# Patient Record
Sex: MALE | Race: WHITE | NOT HISPANIC OR LATINO | Employment: FULL TIME | URBAN - METROPOLITAN AREA
[De-identification: names, ages, dates, MRNs, and addresses within clinical notes are randomized per-mention and may not be internally consistent; named-entity substitution may affect disease eponyms.]

---

## 2018-10-17 ENCOUNTER — OFFICE VISIT (OUTPATIENT)
Dept: URGENT CARE | Facility: CLINIC | Age: 58
End: 2018-10-17
Payer: COMMERCIAL

## 2018-10-17 VITALS
TEMPERATURE: 97.3 F | WEIGHT: 315 LBS | RESPIRATION RATE: 12 BRPM | BODY MASS INDEX: 42.66 KG/M2 | OXYGEN SATURATION: 94 % | SYSTOLIC BLOOD PRESSURE: 130 MMHG | HEART RATE: 93 BPM | HEIGHT: 72 IN | DIASTOLIC BLOOD PRESSURE: 90 MMHG

## 2018-10-17 DIAGNOSIS — L23.9 ALLERGIC DERMATITIS: Primary | ICD-10-CM

## 2018-10-17 DIAGNOSIS — I50.22 CHRONIC SYSTOLIC CONGESTIVE HEART FAILURE (HCC): ICD-10-CM

## 2018-10-17 DIAGNOSIS — I87.2 VENOUS STASIS DERMATITIS OF BOTH LOWER EXTREMITIES: ICD-10-CM

## 2018-10-17 PROBLEM — N18.2 STAGE 2 CHRONIC KIDNEY DISEASE DUE TO TYPE 2 DIABETES MELLITUS (HCC): Status: ACTIVE | Noted: 2017-12-04

## 2018-10-17 PROBLEM — Z79.01 LONG TERM CURRENT USE OF ANTICOAGULANT: Status: ACTIVE | Noted: 2018-09-21

## 2018-10-17 PROBLEM — E11.22 STAGE 2 CHRONIC KIDNEY DISEASE DUE TO TYPE 2 DIABETES MELLITUS (HCC): Status: ACTIVE | Noted: 2017-12-04

## 2018-10-17 PROCEDURE — 99204 OFFICE O/P NEW MOD 45 MIN: CPT | Performed by: INTERNAL MEDICINE

## 2018-10-17 RX ORDER — TRIAMCINOLONE ACETONIDE 1 MG/ML
LOTION TOPICAL
Qty: 1 BOTTLE | Refills: 0 | Status: SHIPPED | OUTPATIENT
Start: 2018-10-17

## 2018-10-17 ASSESSMENT — ENCOUNTER SYMPTOMS
SHORTNESS OF BREATH: 0
VOMITING: 0
COUGH: 0
CHILLS: 0
DIARRHEA: 0
NAUSEA: 0
SORE THROAT: 0
FATIGUE: 0
ABDOMINAL PAIN: 0
CONSTIPATION: 0
FEVER: 0

## 2018-10-17 NOTE — PROGRESS NOTES
Subjective:   Spenser Walton is a 58 y.o. male who presents for Rash (rash on both legs on top of left foot, also mild ithcing on arms x5 days )        Rash   This is a new problem. Episode onset: 5 days. The affected locations include the right foot, left foot, right arm and left arm. The rash is characterized by itchiness. It is unknown (Patient recently traveling from [place] to [facility] ) if there was an exposure to a precipitant. Pertinent negatives include no congestion, cough, diarrhea, facial edema, fatigue, fever, shortness of breath, sore throat or vomiting. Treatments tried: OTC hydrocortisone. The treatment provided no relief. There is no history of allergies, asthma or eczema.     Patient noticed rash 5 days ago.  It has now spread to lower arms.  Rash is itchy.  He has tried OTC hydrocortisone without improvement.  He does have a history of CHF, most recent exacerbation occurred 2 weeks ago required change in carvedilol.  He is unsure of new medication but states he has been on it for 2 weeks without complication.  He denies shortness of breath, difficulty swallowing or breathing.  No known allergies.    He does have a follow-up scheduled with primary care in [place] on Friday.    Review of Systems   Constitutional: Negative for chills, fatigue, fever and malaise/fatigue.   HENT: Negative for congestion and sore throat.    Respiratory: Negative for cough and shortness of breath.    Gastrointestinal: Negative for abdominal pain, constipation, diarrhea, nausea and vomiting.   Skin: Positive for itching and rash.   All other systems reviewed and are negative.      PMH:  has a past medical history of CHF (congestive heart failure) ([facility]).  MEDS:   Current Outpatient Prescriptions:   •  triamcinolone (KENALOG) 0.1 % lotion, Apply to affected area BID, Disp: 1 Bottle, Rfl: 0  ALLERGIES:   Allergies   Allergen Reactions   • Nsaids Unspecified     Avoid     SURGHX: History reviewed. No pertinent surgical  history.  SOCHX:  reports that he has never smoked. He has never used smokeless tobacco.  History reviewed. No pertinent family history.     Objective:   /90 (BP Location: Right arm, Patient Position: Sitting, BP Cuff Size: Large adult)   Pulse 93   Temp 36.3 °C (97.3 °F) (Temporal)   Resp 12   Ht 1.829 m (6')   Wt (!) 179 kg (394 lb 9.6 oz)   SpO2 94%   BMI 53.52 kg/m²     Physical Exam   Constitutional: He is oriented to person, place, and time. He appears well-developed and well-nourished. No distress.   HENT:   Head: Normocephalic and atraumatic.   Eyes: Pupils are equal, round, and reactive to light. Conjunctivae are normal.   Neck: Normal range of motion. Neck supple.   Cardiovascular: Normal rate and regular rhythm.    Pulmonary/Chest: Effort normal and breath sounds normal. No respiratory distress. He has no wheezes. He has no rales.   Lymphadenopathy:     He has no cervical adenopathy.   Neurological: He is alert and oriented to person, place, and time.   Skin: Skin is warm and dry.        Psychiatric: He has a normal mood and affect. His behavior is normal.   Vitals reviewed.        Assessment/Plan:     1. Allergic dermatitis  triamcinolone (KENALOG) 0.1 % lotion   2. Venous stasis dermatitis of both lower extremities     3. Chronic systolic congestive heart failure (HCC)       Advised patient to start a trial of Kenalog lotion.  Patient directed to D/C all suspicious products, frequent-containing products.  He can reintroduce products one by one monitoring for recurrence.  Switch to gentle cleanser CeraVe A/Cetaphil.  Apply sick hydrating cream after application of Kenalog.  Allergic dermatitis and stasis dermatitis educational handout provided.      We discussed the importance of follow-up with primary care provider on Friday as scheduled. Red flags and strict emergency room precautions discussed.  Patient appears understanding of information.       Case and results reviewed and agree with  treatment plan as outlined.  Dr. Zuelta

## 2018-10-17 NOTE — PATIENT INSTRUCTIONS
Switch to:     CeraVe or Cetaphil plain body wash   Apply topical triamcinolone twice day   Apply Cerave or Cetaphil hydrating cream over   Consider adding Claritin/Zyrtec every day for itching   Consider switching to All Free and Clear detergent     Follow up as previous scheduled on Friday with pcp      Stasis Dermatitis  Stasis dermatitis is a long-term (chronic) skin condition that happens when veins can no longer pump blood back to the heart (poor circulation). This condition causes a red or brown scaly rash or sores (ulcers) from the pooling of blood (stasis). This condition usually affects the lower legs. It may affect one leg or both legs.  Without treatment, severe stasis dermatitis can lead to other skin conditions and infections.  What are the causes?  This condition is caused by poor circulation.  What increases the risk?  This condition is more likely to develop in people who:  · Are not very active.  · Stand for long periods of time.  · Have veins that have become enlarged and twisted (varicose veins).  · Have leg veins that are not strong enough to send blood back to the heart (venous insufficiency).  · Have had a blood clot.  · Have been pregnant many times.  · Have had vein surgery.  · Are obese.  · Have heart or kidney failure.  · Are 50 years of age or older.  What are the signs or symptoms?  Common early symptoms of this condition include:  · Swelling in your ankle or leg. This might get better overnight but be worse again in the day.  · Skin that looks thin on your ankle and leg.  · Brown marks that develop slowly.  · Skin that is easily irritated or cracked.  · Red, swollen skin.  · An achy or heavy feeling after you walk or stand for long periods of time.  · Pain.  Later and more severe symptoms of this condition include:  · Skin that looks shiny.  · Small, open sores (ulcers). These are often red or purple.  · Dry, cracking skin.  · Skin that feels hard.  · Severe itching.  · A change in the  shape or color of your lower legs.  · Severe pain.  · Difficulty walking.  How is this diagnosed?  Your health care provider may suspect this condition from your symptoms and medical history. Your health care provider will also do a physical exam. You may need to see a health care provider who specializes in skin diseases (dermatologist). You may also have tests to confirm the diagnosis, including:  · Blood tests.  · Imaging studies to check blood flow (Doppler ultrasound).  · Allergy tests.  How is this treated?  Treatment for this condition may include medicine, such as:  · Corticosteroid creams and ointments.  · Non-corticosteroid medicines applied to the skin (topical).  · Medicine to reduce swelling in the legs (diuretics).  · Antibiotics.  · Medicine to relieve itching (antihistamines).  You may also have to wear:  · Compression stockings or an elastic wrap to improve circulation.  · A bandage (dressing).  · A wrap that contains zinc and gelatin (Unna boot).  Follow these instructions at home:  Skin Care  · Moisturize your skin as told by your health care provider. Do not use moisturizers with fragrance. This can irritate your skin.  · Apply cool compresses to the affected areas.  · Do not scratch your skin.  · Do not rub your skin dry after a bath or shower. Gently pat your skin dry.  · Do not use scented soaps, detergents, or perfumes.  Medicines  · Take or use over-the-counter and prescription medicines only as told by your health care provider.  · If you were prescribed an antibiotic medicine, take or use it as told by your health care provider. Do not stop taking or using the antibiotic even if your condition starts to improve.  Lifestyle  · Do not stand or sit in one position for long periods of time.  · Do not cross your legs when you sit.  · Raise (elevate) your legs above the level of your heart when you are sitting or lying down.  · Walk as told by your health care provider. Walking increases blood  flow.  · Wear comfortable, loose-fitting clothing. Circulation in your legs will be worse if you wear tight pants, belts, and waistbands.  General instructions  · Change and remove any dressing as told by your health care provider, if this applies.  · Wear compression stockings as told by your health care provider, if this applies. These stockings help to prevent blood clots and reduce swelling in your legs.  · Wear the Unna boot as told by your health care provider, if this applies.  · Keep all follow-up visits as told by your health care provider. This is important.  Contact a health care provider if:  · Your condition does not improve with treatment.  · Your condition gets worse.  · You have signs of infection in the affected area. Watch for:  ¨ Swelling.  ¨ Tenderness.  ¨ Redness.  ¨ Soreness.  ¨ Warmth.  · You have a fever.  Get help right away if:  · You notice red streaks coming from the affected area.  · Your bone or joint underneath the affected area becomes painful after the skin has healed.  · The affected area turns darker.  · You feel a deep pain in your leg or groin.  · You are short of breath.  This information is not intended to replace advice given to you by your health care provider. Make sure you discuss any questions you have with your health care provider.  Document Released: 03/29/2007 Document Revised: 08/15/2017 Document Reviewed: 05/04/2016  [employer] Interactive Patient Education © 2017 [employer]